# Patient Record
Sex: MALE | Race: WHITE
[De-identification: names, ages, dates, MRNs, and addresses within clinical notes are randomized per-mention and may not be internally consistent; named-entity substitution may affect disease eponyms.]

---

## 2020-12-02 ENCOUNTER — HOSPITAL ENCOUNTER (EMERGENCY)
Dept: HOSPITAL 47 - EC | Age: 35
Discharge: HOME | End: 2020-12-02
Payer: COMMERCIAL

## 2020-12-02 VITALS
SYSTOLIC BLOOD PRESSURE: 108 MMHG | RESPIRATION RATE: 20 BRPM | DIASTOLIC BLOOD PRESSURE: 62 MMHG | HEART RATE: 94 BPM | TEMPERATURE: 98.3 F
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
## 2020-12-02 DIAGNOSIS — F17.200: ICD-10-CM

## 2020-12-02 DIAGNOSIS — M79.671: Primary | ICD-10-CM

## 2020-12-02 DIAGNOSIS — S92.901D: ICD-10-CM

## 2020-12-02 PROCEDURE — 99283 EMERGENCY DEPT VISIT LOW MDM: CPT

## 2020-12-02 NOTE — ED
Lower Extremity Injury HPI





- General


Chief Complaint: Extremity Injury, Lower


Stated Complaint: Broken Right Foot


Time Seen by Provider: 12/02/20 14:49


Source: patient


Mode of arrival: wheelchair


Limitations: no limitations





- History of Present Illness


Initial Comments: 





35-year-old male presenting to emergency Department with chief complaint of 

needing orthopedic referral.  Patient states that 5 days ago he suffered a right

foot fracture and was diagnosed at [facility].  States 

they applied a splint to his leg, gave him crutches, discharged hin and advised 

him to follow-up with an orthopedic specialist.  Patient states he attempted to 

call several orthopedic orifices but they would not take his insurance.  States 

that he was advised by his friend to come to the emergency department for an 

orthopedic referral.  He states there is also pain at the fracture site.  States

that he was not given any pain medication.  Reports he was been taking Tylenol 

and Motrin for pain.  Denies any numbness or tingling.





- Related Data


                                    Allergies











Allergy/AdvReac Type Severity Reaction Status Date / Time


 


No Known Allergies Allergy   Verified 12/02/20 14:45














Review of Systems


ROS Statement: 


Those systems with pertinent positive or pertinent negative responses have been 

documented in the HPI.





ROS Other: All systems not noted in ROS Statement are negative.





Past Medical History


Past Medical History: No Reported History


History of Any Multi-Drug Resistant Organisms: None Reported


Past Surgical History: No Surgical Hx Reported


Past Psychological History: No Psychological Hx Reported


Smoking Status: Current every day smoker


Past Alcohol Use History: None Reported


Past Drug Use History: None Reported





General Exam


Limitations: no limitations


General appearance: alert, in no apparent distress


Head exam: Present: atraumatic, normocephalic, normal inspection


Eye exam: Present: normal appearance, PERRL, EOMI


Pupils: Present: normal accommodation


ENT exam: Present: normal exam, normal oropharynx, mucous membranes moist, TM's 

normal bilaterally, normal external ear exam


Neck exam: Present: normal inspection, full ROM.  Absent: tenderness


Respiratory exam: Present: normal lung sounds bilaterally.  Absent: respiratory 

distress, wheezes, rales


Cardiovascular Exam: Present: regular rate, normal rhythm, normal heart sounds. 

Absent: systolic murmur, diastolic murmur


Extremities exam: Present: normal inspection (Posterior leg splint applied to 

right lower extremities.  It is dry and sitting in place.), full ROM, normal 

capillary refill.  Absent: pedal edema, joint swelling, calf tenderness (Unable 

to detect calf due to splint on right leg)


Back exam: Present: normal inspection, full ROM.  Absent: tenderness, CVA 

tenderness (R), CVA tenderness (L)


Neurological exam: Present: alert, oriented X3


Psychiatric exam: Present: normal affect, normal mood


Skin exam: Present: warm, dry, intact, normal color





Course


                                   Vital Signs











  12/02/20





  14:38


 


Temperature 98.3 F


 


Pulse Rate 94


 


Respiratory 20





Rate 


 


Blood Pressure 108/62


 


O2 Sat by Pulse 98





Oximetry 














Medical Decision Making





- Medical Decision Making





35-year-old male presenting to emergency Department with a chief complaint of 

needing orthopedic referral.  On physical examination, the splint is dry and 

appears to be well in place.  Patient was given Tylenol 3 starter pack to help 

alleviate some of the discomfort.  I gave the patient multiple orthopedic 

recommendations.  I also gave him contact information for Dr. Li who was the 

orthopedic doctor on-call.  Strict return parameters were thoroughly discussed 

with patient is understanding and agreeable.  Case discussed with physician.





Disposition


Clinical Impression: 


 Foot pain, right





Disposition: HOME SELF-CARE


Condition: Stable


Instructions (If sedation given, give patient instructions):  Foot Fracture in 

Adults (ED)


Additional Instructions: 


Follow-up with orthopedic specialist.  Take given medication as directed.  

Return to emergency department if symptoms worsen.


Is patient prescribed a controlled substance at d/c from ED?: No


Referrals: 


Neymar Stover DO [Primary Care Provider] - 1-2 days


Rolo Rasheed MD [STAFF PHYSICIAN] - 1-2 days


THELMA Keyes DO [Doctor of Osteopathic Medicine] - 1-2 days


Amaury Seth MD [STAFF PHYSICIAN] - 1-2 days


Chris Ramsey DO [Doctor of Osteopathic Medicine] - 1-2 days


Time of Disposition: 15:06